# Patient Record
Sex: FEMALE | Race: WHITE
[De-identification: names, ages, dates, MRNs, and addresses within clinical notes are randomized per-mention and may not be internally consistent; named-entity substitution may affect disease eponyms.]

---

## 2017-04-28 ENCOUNTER — HOSPITAL ENCOUNTER (OUTPATIENT)
Dept: HOSPITAL 82 - ENDO | Age: 72
Discharge: HOME | End: 2017-04-28
Attending: INTERNAL MEDICINE
Payer: MEDICARE

## 2017-04-28 VITALS — HEIGHT: 59 IN | WEIGHT: 145 LBS | BODY MASS INDEX: 29.23 KG/M2

## 2017-04-28 VITALS — DIASTOLIC BLOOD PRESSURE: 60 MMHG | SYSTOLIC BLOOD PRESSURE: 135 MMHG

## 2017-04-28 DIAGNOSIS — K31.7: ICD-10-CM

## 2017-04-28 DIAGNOSIS — K44.9: ICD-10-CM

## 2017-04-28 DIAGNOSIS — E78.00: ICD-10-CM

## 2017-04-28 DIAGNOSIS — K21.9: ICD-10-CM

## 2017-04-28 DIAGNOSIS — I10: ICD-10-CM

## 2017-04-28 DIAGNOSIS — K29.70: ICD-10-CM

## 2017-04-28 DIAGNOSIS — R11.0: ICD-10-CM

## 2017-04-28 DIAGNOSIS — R14.0: Primary | ICD-10-CM

## 2017-04-28 PROCEDURE — 0DB48ZX EXCISION OF ESOPHAGOGASTRIC JUNCTION, VIA NATURAL OR ARTIFICIAL OPENING ENDOSCOPIC, DIAGNOSTIC: ICD-10-PCS | Performed by: INTERNAL MEDICINE

## 2017-04-28 PROCEDURE — 0DB68ZX EXCISION OF STOMACH, VIA NATURAL OR ARTIFICIAL OPENING ENDOSCOPIC, DIAGNOSTIC: ICD-10-PCS | Performed by: INTERNAL MEDICINE

## 2017-05-07 ENCOUNTER — HOSPITAL ENCOUNTER (EMERGENCY)
Dept: HOSPITAL 82 - ED | Age: 72
Discharge: HOME | End: 2017-05-07
Payer: MEDICARE

## 2017-05-07 VITALS — HEIGHT: 59 IN | BODY MASS INDEX: 28.89 KG/M2 | WEIGHT: 143.3 LBS

## 2017-05-07 VITALS — SYSTOLIC BLOOD PRESSURE: 136 MMHG | DIASTOLIC BLOOD PRESSURE: 90 MMHG

## 2017-05-07 DIAGNOSIS — N39.0: ICD-10-CM

## 2017-05-07 DIAGNOSIS — R10.31: Primary | ICD-10-CM

## 2017-05-07 DIAGNOSIS — R11.0: ICD-10-CM

## 2017-05-07 DIAGNOSIS — K57.30: ICD-10-CM

## 2017-05-07 DIAGNOSIS — R10.32: ICD-10-CM

## 2017-05-07 LAB
ALBUMIN SERPL-MCNC: 4.5 G/DL (ref 3.2–5)
ALP SERPL-CCNC: 57 U/L (ref 38–126)
ALT SERPL-CCNC: 42 U/L (ref 11–66)
AMYLASE SERPL-CCNC: 57 U/L (ref 30–110)
ANION GAP SERPL CALCULATED.3IONS-SCNC: 17 MMOL/L
AST SERPL-CCNC: 27 U/L (ref 9–36)
BASOPHILS NFR BLD AUTO: 1 % (ref 0–3)
BILIRUB UR QL STRIP.AUTO: NEGATIVE
BUN SERPL-MCNC: 14 MG/DL (ref 8–23)
BUN/CREAT SERPL: 17
CALCIUM SERPL-MCNC: 9.6 MG/DL (ref 8.4–10.2)
CHLORIDE SERPL-SCNC: 103 MMOL/L (ref 95–108)
CLARITY UR: CLEAR
CO2 SERPL-SCNC: 24 MMOL/L (ref 22–30)
COLOR UR AUTO: YELLOW
CREAT SERPL-MCNC: 0.8 MG/DL (ref 0.5–1)
EOSINOPHIL NFR BLD AUTO: 2 % (ref 0–8)
ERYTHROCYTE [DISTWIDTH] IN BLOOD BY AUTOMATED COUNT: 13.3 % (ref 11.5–15.5)
GLUCOSE SERPL-MCNC: 126 MG/DL (ref 82–115)
GLUCOSE UR STRIP.AUTO-MCNC: NEGATIVE MG/DL
HCT VFR BLD AUTO: 39.8 % (ref 37–47)
HGB BLD-MCNC: 13.3 G/DL (ref 12–16)
HGB UR QL STRIP.AUTO: NEGATIVE
IMM GRANULOCYTES NFR BLD: 0.4 % (ref 0–1)
KETONES UR STRIP.AUTO-MCNC: (no result) MG/DL
LEUKOCYTE ESTERASE UR QL STRIP.AUTO: (no result)
LIPASE SERPL-CCNC: 186 U/L (ref 23–300)
LYMPHOCYTES NFR BLD: 26 % (ref 15–41)
MCH RBC QN AUTO: 29.4 PG  CALC (ref 26–32)
MCHC RBC AUTO-ENTMCNC: 33.4 G/L CALC (ref 32–36)
MCV RBC AUTO: 87.9 FL  CALC (ref 80–100)
MONOCYTES NFR BLD AUTO: 7 % (ref 2–13)
MYOGLOBIN SERPL-MCNC: 30 NG/ML (ref 0–62)
NEUTROPHILS # BLD AUTO: 6.61 THOU/UL (ref 2–7.15)
NEUTROPHILS NFR BLD AUTO: 64 % (ref 42–76)
NITRITE UR QL STRIP.AUTO: NEGATIVE
PH UR STRIP.AUTO: 5 [PH] (ref 4.5–8)
PLATELET # BLD AUTO: 267 THOU/UL (ref 130–400)
POTASSIUM SERPL-SCNC: 3.9 MMOL/L (ref 3.5–5.1)
PROT SERPL-MCNC: 7.6 G/DL (ref 6.3–8.2)
PROT UR QL STRIP.AUTO: NEGATIVE MG/DL
RBC # BLD AUTO: 4.53 MILL/UL (ref 4.2–5.6)
SODIUM SERPL-SCNC: 139 MMOL/L (ref 137–146)
SP GR UR STRIP.AUTO: 1.01
SQUAMOUS URNS QL MICRO: (no result) EPI/HPF
UROBILINOGEN UR QL STRIP.AUTO: 0.2 E.U./DL
WBC #/AREA URNS HPF: (no result) WBC/HPF (ref 0–5)

## 2017-06-17 ENCOUNTER — HOSPITAL ENCOUNTER (EMERGENCY)
Dept: HOSPITAL 82 - ED | Age: 72
Discharge: HOME | End: 2017-06-17
Payer: MEDICARE

## 2017-06-17 VITALS — WEIGHT: 154.32 LBS | BODY MASS INDEX: 31.11 KG/M2 | HEIGHT: 59 IN

## 2017-06-17 VITALS — DIASTOLIC BLOOD PRESSURE: 82 MMHG | SYSTOLIC BLOOD PRESSURE: 122 MMHG

## 2017-06-17 DIAGNOSIS — F32.9: ICD-10-CM

## 2017-06-17 DIAGNOSIS — K21.9: ICD-10-CM

## 2017-06-17 DIAGNOSIS — E78.00: ICD-10-CM

## 2017-06-17 DIAGNOSIS — B02.9: Primary | ICD-10-CM

## 2017-06-17 DIAGNOSIS — I10: ICD-10-CM

## 2017-08-11 ENCOUNTER — HOSPITAL ENCOUNTER (OUTPATIENT)
Dept: HOSPITAL 82 - ENDO | Age: 72
Discharge: HOME | End: 2017-08-11
Attending: INTERNAL MEDICINE
Payer: MEDICARE

## 2017-08-11 VITALS — HEIGHT: 59 IN | WEIGHT: 140 LBS | BODY MASS INDEX: 28.22 KG/M2

## 2017-08-11 VITALS — DIASTOLIC BLOOD PRESSURE: 73 MMHG | SYSTOLIC BLOOD PRESSURE: 104 MMHG

## 2017-08-11 DIAGNOSIS — I10: ICD-10-CM

## 2017-08-11 DIAGNOSIS — K63.89: ICD-10-CM

## 2017-08-11 DIAGNOSIS — Z86.010: ICD-10-CM

## 2017-08-11 DIAGNOSIS — K64.8: ICD-10-CM

## 2017-08-11 DIAGNOSIS — Q43.9: ICD-10-CM

## 2017-08-11 DIAGNOSIS — E78.00: ICD-10-CM

## 2017-08-11 DIAGNOSIS — K64.4: ICD-10-CM

## 2017-08-11 DIAGNOSIS — K57.30: Primary | ICD-10-CM

## 2017-08-11 DIAGNOSIS — K29.70: ICD-10-CM

## 2017-08-11 DIAGNOSIS — R14.0: ICD-10-CM

## 2017-08-11 DIAGNOSIS — K62.1: ICD-10-CM

## 2017-08-11 DIAGNOSIS — K21.9: ICD-10-CM

## 2017-08-11 DIAGNOSIS — K44.9: ICD-10-CM

## 2017-08-11 DIAGNOSIS — D12.5: ICD-10-CM

## 2017-08-11 PROCEDURE — 0DBH8ZX EXCISION OF CECUM, VIA NATURAL OR ARTIFICIAL OPENING ENDOSCOPIC, DIAGNOSTIC: ICD-10-PCS | Performed by: INTERNAL MEDICINE

## 2017-08-11 PROCEDURE — 0DBP8ZX EXCISION OF RECTUM, VIA NATURAL OR ARTIFICIAL OPENING ENDOSCOPIC, DIAGNOSTIC: ICD-10-PCS | Performed by: INTERNAL MEDICINE

## 2017-08-11 PROCEDURE — 0DBN8ZX EXCISION OF SIGMOID COLON, VIA NATURAL OR ARTIFICIAL OPENING ENDOSCOPIC, DIAGNOSTIC: ICD-10-PCS | Performed by: INTERNAL MEDICINE

## 2019-01-18 ENCOUNTER — HOSPITAL ENCOUNTER (OUTPATIENT)
Dept: HOSPITAL 82 - ED | Age: 74
Setting detail: OBSERVATION
LOS: 1 days | Discharge: HOME | End: 2019-01-19
Attending: INTERNAL MEDICINE | Admitting: INTERNAL MEDICINE
Payer: MEDICARE

## 2019-01-18 VITALS — DIASTOLIC BLOOD PRESSURE: 77 MMHG | SYSTOLIC BLOOD PRESSURE: 136 MMHG

## 2019-01-18 VITALS — SYSTOLIC BLOOD PRESSURE: 137 MMHG | DIASTOLIC BLOOD PRESSURE: 88 MMHG

## 2019-01-18 VITALS — WEIGHT: 141.25 LBS | HEIGHT: 59 IN | BODY MASS INDEX: 28.48 KG/M2

## 2019-01-18 VITALS — DIASTOLIC BLOOD PRESSURE: 93 MMHG | SYSTOLIC BLOOD PRESSURE: 163 MMHG

## 2019-01-18 DIAGNOSIS — K21.9: ICD-10-CM

## 2019-01-18 DIAGNOSIS — G89.29: ICD-10-CM

## 2019-01-18 DIAGNOSIS — Z79.82: ICD-10-CM

## 2019-01-18 DIAGNOSIS — F41.8: ICD-10-CM

## 2019-01-18 DIAGNOSIS — E78.5: ICD-10-CM

## 2019-01-18 DIAGNOSIS — I10: ICD-10-CM

## 2019-01-18 DIAGNOSIS — M19.90: ICD-10-CM

## 2019-01-18 DIAGNOSIS — R07.9: Primary | ICD-10-CM

## 2019-01-18 DIAGNOSIS — M54.9: ICD-10-CM

## 2019-01-18 DIAGNOSIS — K58.9: ICD-10-CM

## 2019-01-18 DIAGNOSIS — K44.9: ICD-10-CM

## 2019-01-18 LAB
ALBUMIN SERPL-MCNC: 4.4 G/DL (ref 3.2–5)
ALP SERPL-CCNC: 50 U/L (ref 38–126)
ANION GAP SERPL CALCULATED.3IONS-SCNC: 15 MMOL/L
AST SERPL-CCNC: 25 U/L (ref 9–36)
BASOPHILS NFR BLD AUTO: 1 % (ref 0–3)
BILIRUB UR QL STRIP.AUTO: NEGATIVE
BUN SERPL-MCNC: 13 MG/DL (ref 8–23)
BUN/CREAT SERPL: 23
CHLORIDE SERPL-SCNC: 104 MMOL/L (ref 95–108)
CO2 SERPL-SCNC: 22 MMOL/L (ref 22–30)
COLOR UR AUTO: YELLOW
CREAT SERPL-MCNC: 0.5 MG/DL (ref 0.5–1)
EOSINOPHIL NFR BLD AUTO: 3 % (ref 0–8)
ERYTHROCYTE [DISTWIDTH] IN BLOOD BY AUTOMATED COUNT: 12.6 % (ref 11.5–15.5)
GLUCOSE UR STRIP.AUTO-MCNC: NEGATIVE MG/DL
HCT VFR BLD AUTO: 42.1 % (ref 37–47)
HGB BLD-MCNC: 14.3 G/DL (ref 12–16)
HGB UR QL STRIP.AUTO: NEGATIVE
IMM GRANULOCYTES NFR BLD: 0.2 % (ref 0–5)
KETONES UR STRIP.AUTO-MCNC: NEGATIVE MG/DL
LEUKOCYTE ESTERASE UR QL STRIP.AUTO: NEGATIVE
LYMPHOCYTES NFR BLD: 34 % (ref 15–41)
MCH RBC QN AUTO: 29.5 PG  CALC (ref 26–32)
MCHC RBC AUTO-ENTMCNC: 34 G/L CALC (ref 32–36)
MCV RBC AUTO: 86.8 FL  CALC (ref 80–100)
MONOCYTES NFR BLD AUTO: 7 % (ref 2–13)
MYOGLOBIN SERPL-MCNC: 31 NG/ML (ref 0–62)
NEUTROPHILS # BLD AUTO: 3.15 THOU/UL (ref 2–7.15)
NEUTROPHILS NFR BLD AUTO: 55 % (ref 42–76)
NITRITE UR QL STRIP.AUTO: NEGATIVE
PH UR STRIP.AUTO: 7 [PH] (ref 4.5–8)
PLATELET # BLD AUTO: 253 THOU/UL (ref 130–400)
POTASSIUM SERPL-SCNC: 4.1 MMOL/L (ref 3.5–5.1)
PROT SERPL-MCNC: 7 G/DL (ref 6.3–8.2)
PROT UR QL STRIP.AUTO: NEGATIVE MG/DL
RBC # BLD AUTO: 4.85 MILL/UL (ref 4.2–5.6)
SODIUM SERPL-SCNC: 137 MMOL/L (ref 137–146)
SP GR UR STRIP.AUTO: <=1.005
UROBILINOGEN UR QL STRIP.AUTO: 0.2 E.U./DL

## 2019-01-18 PROCEDURE — S0164 INJECTION PANTROPRAZOLE: HCPCS

## 2019-01-18 NOTE — NUR
PT ASSESSED, PT DENIES PAIN/N/V, REPORTS "SMALL GASSY FEELING TWINGES UP
IN CHEST ONCE IN AWHILE." LUNG SOUNDS ARE CLEAR, ABD SOFT TENDER TO LOWER QUAD
BILAT/PT REPORTS THIS IS HER NORMAL, REPORTS BMX1 TODAY NORMAL, DENIES
DIFFICULTY URINATING, LOCX4, NEURO'S INTACT, SKIN INTACT, NO EDEMA NOTED.
MURTAZA'S AND SCD'S ARE ON. POC AND LABS DISCUSSED W/PT. PT DENIES ANY OTHER
QUESTIONS AT THIS TIME. CALL LIGHT AT BEDSIDE AND PT OFFERED SNACK/PROVIDED.
PT ENCOURAGED TO CALL IF ANY OTHER SYMPTOMS OR NEEDS ARISE.

## 2019-01-18 NOTE — NUR
PT ARRIVED ON FLOOR @ 11:00;  VIA W/C ACCOMPANIED BY ROBERTA MARCELINO AND PT'S
SIGNIFICANT OTHER; PT AMBULATED WITH STEADY GAIT ON DIGITAL SCALE; WT
OBTAINED; RESP EVEN AND UNLABORED;  TELE IN PLACE; PT REPORT NO PAIN; PT
ORIENT TO ROOM AND CALL TILLEY SYSTEM; STATES SHES HUNGRY; WILL CONTINUE TO
MONITOR.

## 2019-01-18 NOTE — NUR
PT IS IN BED W/ AT BEDSIDE. NO S/O DISTRESS NOTED, PT DENIES ANY
PAIN/N/V AT THIS TIME. BEDSIDE REPORT RECEIVED,POC DISCUSSED AND PT ENCOURAGED
TO CALL AS NEEDS ARISE OR ANY SYMPTOMS ARISE. CALL LIGHT AT BEDSIDE.

## 2019-01-18 NOTE — NUR
DR DORSEY AND GUI, AT BEDSIDE TO DISCUSS POC;  PT LAYING IN BED WITH HOB
ELEVATED, RESP EVEN AND UNLABORED; IV SITE APPEARS HEALTHY; CALL TILLEY IN
REACH.

## 2019-01-18 NOTE — NUR
PT SITTING UP IN BED EATING LUNCH AND LOOKING AT HER PHONE; STATES SHE'S VERY
HUNGRY; EXPLAINED MURTAZA HOSE AND SCD; PT VOICE UNDERSTANDING; VOICE NO PAIN; SR
69 ON MONITOR; CALL TILLEY IN REACH; SAFETY PRECAUTION REINFORCE;

## 2019-01-19 VITALS — SYSTOLIC BLOOD PRESSURE: 133 MMHG | DIASTOLIC BLOOD PRESSURE: 83 MMHG

## 2019-01-19 VITALS — DIASTOLIC BLOOD PRESSURE: 81 MMHG | SYSTOLIC BLOOD PRESSURE: 158 MMHG

## 2019-01-19 VITALS — DIASTOLIC BLOOD PRESSURE: 85 MMHG | SYSTOLIC BLOOD PRESSURE: 158 MMHG

## 2019-01-19 VITALS — DIASTOLIC BLOOD PRESSURE: 50 MMHG | SYSTOLIC BLOOD PRESSURE: 148 MMHG

## 2019-01-19 LAB
CHOLEST SERPL-MCNC: 243 MG/DL (ref 0–199)
CHOLEST/HDLC SERPL: 3.8 {RATIO}
HDLC SERPL-MCNC: 64 MG/DL (ref 40–?)
LDLC SERPL CALC-MCNC: 152 MG/DL
TRIGL SERPL-MCNC: 137 MG/DL (ref 30–149)
VLDLC SERPL CALC-MCNC: 27 MG/DL

## 2019-01-19 NOTE — NUR
PT REPORT RECIEVED FROM ROBERTA REDMOND. PT RESTING IN BED. NO S/S OF DISTRESS. CALL
LIGHT IN REACH. WILL CONTINUE TO MONITOR.

## 2019-01-19 NOTE — NUR
Discharge instructions given. Patient verbalizes understanding of same.
Discharged in \stable condition via Wheelchair to Home with
family. All belongings sent with pt.

## 2019-01-19 NOTE — NUR
PT RESTNG IN BED. NO C/O PAIN OR NEEDS. RESP EVEN AND UNLABORED. TELE IN
PLACE. CALL LIGHT IN REACH. WILL CONTINUE TO MONITOR.

## 2019-01-19 NOTE — NUR
PT APPEARS TO BE SLEEPING AT THIS TIME. NO S/O DISTRESS, CALL LIGHT AT SIDE.
DENIES ANY NEEDS, DRESSING TO WOUND VAC APPEARS INTACT WITH SUCTION ON .

## 2019-01-19 NOTE — NUR
D/C INSTRUCTIONS DISCUSSED W/PT. PT STATES UNDERSTANDING. IV REMOVED. CATHETER
INTACT. PT DRESSED AND READY TO GO. DANIEL HUNTLEY TO WHEEL PATIENT DOWNSTAIRS.

## 2019-01-19 NOTE — NUR
LAB IN W/PT AT THIS TIME, PT DENIES ANY PAIN/N/V AT THIS TIME. NO S/O DISTRSES
NOTED,  PT REPORTS HAVING BEEN SLEEPING SOUNDLY W/O DISTRESS, PAIN OR SOB.

## 2019-01-19 NOTE — NUR
PT A/OX3. SPEECH IS CLEAR. NO C/O CHEST PAIN AT THIS TIME. RESP EVEN AND
UNLABORED. LUNG SOUNDS CLEAR. TELE IN PLACE. BOWEL SOUNDS ACTIVE X4. STRONG
RADIAL AND PEDAL PULSES. #20 RAC SL. FLUSHED AND PATENT. SCD AND MURTAZA HOSES IN
PLACE. SKIN INTACT. PT DENIES ANY NEEDS AT THIS TIME. POC DISCUSSED. SAFETY
PRECAUTIONS IN PLACE. CALL LIGHT IN REACH. WILL CONTINUE TO MONITOR.

## 2019-03-20 ENCOUNTER — HOSPITAL ENCOUNTER (EMERGENCY)
Dept: HOSPITAL 82 - ED | Age: 74
Discharge: HOME | End: 2019-03-20
Payer: MEDICARE

## 2019-03-20 VITALS — HEIGHT: 59 IN | BODY MASS INDEX: 28.89 KG/M2 | WEIGHT: 143.3 LBS

## 2019-03-20 VITALS — DIASTOLIC BLOOD PRESSURE: 98 MMHG | SYSTOLIC BLOOD PRESSURE: 145 MMHG

## 2019-03-20 DIAGNOSIS — R51: ICD-10-CM

## 2019-03-20 DIAGNOSIS — I10: Primary | ICD-10-CM

## 2019-03-20 LAB
ANION GAP SERPL CALCULATED.3IONS-SCNC: 16 MMOL/L
BASOPHILS NFR BLD AUTO: 1 % (ref 0–3)
BUN SERPL-MCNC: 14 MG/DL (ref 8–23)
BUN/CREAT SERPL: 23
CHLORIDE SERPL-SCNC: 102 MMOL/L (ref 95–108)
CO2 SERPL-SCNC: 22 MMOL/L (ref 22–30)
CREAT SERPL-MCNC: 0.6 MG/DL (ref 0.5–1)
EOSINOPHIL NFR BLD AUTO: 2 % (ref 0–8)
ERYTHROCYTE [DISTWIDTH] IN BLOOD BY AUTOMATED COUNT: 14.7 % (ref 11.5–15.5)
HCT VFR BLD AUTO: 39.6 % (ref 37–47)
HGB BLD-MCNC: 13.2 G/DL (ref 12–16)
IMM GRANULOCYTES NFR BLD: 0.3 % (ref 0–5)
LYMPHOCYTES NFR BLD: 31 % (ref 15–41)
MCH RBC QN AUTO: 28.7 PG  CALC (ref 26–32)
MCHC RBC AUTO-ENTMCNC: 33.3 G/L CALC (ref 32–36)
MCV RBC AUTO: 86.1 FL  CALC (ref 80–100)
MONOCYTES NFR BLD AUTO: 8 % (ref 2–13)
NEUTROPHILS # BLD AUTO: 4.47 THOU/UL (ref 2–7.15)
NEUTROPHILS NFR BLD AUTO: 58 % (ref 42–76)
PLATELET # BLD AUTO: 253 THOU/UL (ref 130–400)
POTASSIUM SERPL-SCNC: 4.1 MMOL/L (ref 3.5–5.1)
RBC # BLD AUTO: 4.6 MILL/UL (ref 4.2–5.6)
SODIUM SERPL-SCNC: 136 MMOL/L (ref 137–146)

## 2022-11-30 ENCOUNTER — HOSPITAL ENCOUNTER (EMERGENCY)
Dept: HOSPITAL 82 - ED | Age: 77
Discharge: HOME | End: 2022-11-30
Payer: MEDICARE

## 2022-11-30 VITALS — SYSTOLIC BLOOD PRESSURE: 139 MMHG | DIASTOLIC BLOOD PRESSURE: 95 MMHG

## 2022-11-30 VITALS — WEIGHT: 140.21 LBS | HEIGHT: 59 IN | BODY MASS INDEX: 28.27 KG/M2

## 2022-11-30 VITALS — SYSTOLIC BLOOD PRESSURE: 149 MMHG | DIASTOLIC BLOOD PRESSURE: 102 MMHG

## 2022-11-30 VITALS — DIASTOLIC BLOOD PRESSURE: 90 MMHG | SYSTOLIC BLOOD PRESSURE: 161 MMHG

## 2022-11-30 VITALS — SYSTOLIC BLOOD PRESSURE: 147 MMHG | DIASTOLIC BLOOD PRESSURE: 101 MMHG

## 2022-11-30 VITALS — SYSTOLIC BLOOD PRESSURE: 141 MMHG | DIASTOLIC BLOOD PRESSURE: 105 MMHG

## 2022-11-30 VITALS — DIASTOLIC BLOOD PRESSURE: 99 MMHG | SYSTOLIC BLOOD PRESSURE: 159 MMHG

## 2022-11-30 VITALS — DIASTOLIC BLOOD PRESSURE: 103 MMHG | SYSTOLIC BLOOD PRESSURE: 173 MMHG

## 2022-11-30 VITALS — DIASTOLIC BLOOD PRESSURE: 90 MMHG | SYSTOLIC BLOOD PRESSURE: 134 MMHG

## 2022-11-30 DIAGNOSIS — E11.9: ICD-10-CM

## 2022-11-30 DIAGNOSIS — E78.5: ICD-10-CM

## 2022-11-30 DIAGNOSIS — R42: Primary | ICD-10-CM

## 2022-11-30 DIAGNOSIS — I10: ICD-10-CM

## 2022-11-30 DIAGNOSIS — K21.9: ICD-10-CM

## 2022-11-30 DIAGNOSIS — Z20.822: ICD-10-CM

## 2022-11-30 DIAGNOSIS — F32.A: ICD-10-CM

## 2022-11-30 LAB
ALBUMIN SERPL-MCNC: 4.5 G/DL (ref 3.2–5)
ALP SERPL-CCNC: 31 U/L (ref 38–126)
ANION GAP SERPL CALCULATED.3IONS-SCNC: 15 MMOL/L
AST SERPL-CCNC: 36 U/L (ref 9–36)
BASOPHILS NFR BLD AUTO: 1 % (ref 0–3)
BILIRUB UR QL STRIP.AUTO: NEGATIVE
BUN SERPL-MCNC: 14 MG/DL (ref 8–23)
BUN/CREAT SERPL: 23
CHLORIDE SERPL-SCNC: 101 MMOL/L (ref 95–108)
CO2 SERPL-SCNC: 25 MMOL/L (ref 22–30)
COLOR UR AUTO: YELLOW
CREAT SERPL-MCNC: 0.6 MG/DL (ref 0.5–1)
EOSINOPHIL NFR BLD AUTO: 2 % (ref 0–8)
ERYTHROCYTE [DISTWIDTH] IN BLOOD BY AUTOMATED COUNT: 12.8 % (ref 11.5–15.5)
GLUCOSE UR STRIP.AUTO-MCNC: NEGATIVE MG/DL
HCT VFR BLD AUTO: 39.9 % (ref 37–47)
HGB BLD-MCNC: 13.4 G/DL (ref 12–16)
HGB UR QL STRIP.AUTO: NEGATIVE
IMM GRANULOCYTES NFR BLD: 0.4 % (ref 0–5)
KETONES UR STRIP.AUTO-MCNC: NEGATIVE MG/DL
LEUKOCYTE ESTERASE UR QL STRIP.AUTO: (no result)
LYMPHOCYTES NFR BLD: 27 % (ref 15–41)
MCH RBC QN AUTO: 30.5 PG  CALC (ref 26–32)
MCHC RBC AUTO-ENTMCNC: 33.6 G/DL CAL (ref 32–36)
MCV RBC AUTO: 90.9 FL  CALC (ref 80–100)
MONOCYTES NFR BLD AUTO: 9 % (ref 2–13)
NEUTROPHILS # BLD AUTO: 4.82 THOU/UL (ref 2–7.15)
NEUTROPHILS NFR BLD AUTO: 61 % (ref 42–76)
NITRITE UR QL STRIP.AUTO: NEGATIVE
PH UR STRIP.AUTO: 6 [PH] (ref 4.5–8)
PLATELET # BLD AUTO: 259 THOU/UL (ref 130–400)
POTASSIUM SERPL-SCNC: 4.2 MMOL/L (ref 3.5–5.1)
PROT SERPL-MCNC: 7.3 G/DL (ref 6.3–8.2)
PROT UR QL STRIP.AUTO: NEGATIVE MG/DL
RBC # BLD AUTO: 4.39 MILL/UL (ref 4.2–5.6)
SODIUM SERPL-SCNC: 136 MMOL/L (ref 137–146)
SP GR UR STRIP.AUTO: 1.01
UROBILINOGEN UR QL STRIP.AUTO: 0.2 E.U./DL

## 2023-01-25 ENCOUNTER — APPOINTMENT (RX ONLY)
Dept: URBAN - NONMETROPOLITAN AREA CLINIC 10 | Facility: CLINIC | Age: 78
Setting detail: DERMATOLOGY
End: 2023-01-25

## 2023-01-25 DIAGNOSIS — L71.0 PERIORAL DERMATITIS: ICD-10-CM

## 2023-01-25 DIAGNOSIS — L82.1 OTHER SEBORRHEIC KERATOSIS: ICD-10-CM

## 2023-01-25 PROBLEM — D23.5 OTHER BENIGN NEOPLASM OF SKIN OF TRUNK: Status: ACTIVE | Noted: 2023-01-25

## 2023-01-25 PROCEDURE — ? COUNSELING

## 2023-01-25 PROCEDURE — ? PRESCRIPTION

## 2023-01-25 PROCEDURE — 99203 OFFICE O/P NEW LOW 30 MIN: CPT

## 2023-01-25 RX ORDER — METRONIDAZOLE 7.5 MG/G
1 CREAM TOPICAL BID
Qty: 45 | Refills: 3 | Status: ERX | COMMUNITY
Start: 2023-01-25

## 2023-01-25 RX ADMIN — METRONIDAZOLE 1: 7.5 CREAM TOPICAL at 00:00

## 2023-01-25 ASSESSMENT — LOCATION DETAILED DESCRIPTION DERM
LOCATION DETAILED: INFERIOR MID FOREHEAD
LOCATION DETAILED: LEFT CLAVICULAR SKIN
LOCATION DETAILED: RIGHT SUPERIOR MEDIAL BUCCAL CHEEK
LOCATION DETAILED: LEFT CENTRAL MALAR CHEEK
LOCATION DETAILED: RIGHT MEDIAL MALAR CHEEK

## 2023-01-25 ASSESSMENT — LOCATION ZONE DERM
LOCATION ZONE: TRUNK
LOCATION ZONE: FACE

## 2023-01-25 ASSESSMENT — LOCATION SIMPLE DESCRIPTION DERM
LOCATION SIMPLE: INFERIOR FOREHEAD
LOCATION SIMPLE: RIGHT CHEEK
LOCATION SIMPLE: LEFT CHEEK
LOCATION SIMPLE: LEFT CLAVICULAR SKIN

## 2025-01-08 ENCOUNTER — HOSPITAL ENCOUNTER (OUTPATIENT)
Dept: HOSPITAL 82 - ED | Age: 80
Setting detail: OBSERVATION
LOS: 2 days | Discharge: LEFT BEFORE DISCHARGE | End: 2025-01-10
Attending: INTERNAL MEDICINE | Admitting: INTERNAL MEDICINE
Payer: MEDICARE

## 2025-01-08 VITALS — SYSTOLIC BLOOD PRESSURE: 142 MMHG | DIASTOLIC BLOOD PRESSURE: 79 MMHG

## 2025-01-08 VITALS — DIASTOLIC BLOOD PRESSURE: 85 MMHG | SYSTOLIC BLOOD PRESSURE: 125 MMHG

## 2025-01-08 VITALS — DIASTOLIC BLOOD PRESSURE: 113 MMHG | SYSTOLIC BLOOD PRESSURE: 157 MMHG

## 2025-01-08 VITALS — SYSTOLIC BLOOD PRESSURE: 141 MMHG | DIASTOLIC BLOOD PRESSURE: 85 MMHG

## 2025-01-08 VITALS — SYSTOLIC BLOOD PRESSURE: 135 MMHG | DIASTOLIC BLOOD PRESSURE: 88 MMHG

## 2025-01-08 VITALS — DIASTOLIC BLOOD PRESSURE: 108 MMHG | SYSTOLIC BLOOD PRESSURE: 177 MMHG

## 2025-01-08 VITALS — SYSTOLIC BLOOD PRESSURE: 162 MMHG | DIASTOLIC BLOOD PRESSURE: 94 MMHG

## 2025-01-08 VITALS — DIASTOLIC BLOOD PRESSURE: 107 MMHG | SYSTOLIC BLOOD PRESSURE: 155 MMHG

## 2025-01-08 VITALS — DIASTOLIC BLOOD PRESSURE: 98 MMHG | SYSTOLIC BLOOD PRESSURE: 177 MMHG

## 2025-01-08 VITALS — SYSTOLIC BLOOD PRESSURE: 122 MMHG | DIASTOLIC BLOOD PRESSURE: 101 MMHG

## 2025-01-08 VITALS — SYSTOLIC BLOOD PRESSURE: 132 MMHG | DIASTOLIC BLOOD PRESSURE: 76 MMHG

## 2025-01-08 VITALS — DIASTOLIC BLOOD PRESSURE: 90 MMHG | SYSTOLIC BLOOD PRESSURE: 151 MMHG

## 2025-01-08 VITALS — SYSTOLIC BLOOD PRESSURE: 143 MMHG | DIASTOLIC BLOOD PRESSURE: 90 MMHG

## 2025-01-08 VITALS — SYSTOLIC BLOOD PRESSURE: 188 MMHG | DIASTOLIC BLOOD PRESSURE: 104 MMHG

## 2025-01-08 VITALS — SYSTOLIC BLOOD PRESSURE: 132 MMHG | DIASTOLIC BLOOD PRESSURE: 82 MMHG

## 2025-01-08 VITALS — DIASTOLIC BLOOD PRESSURE: 107 MMHG | SYSTOLIC BLOOD PRESSURE: 174 MMHG

## 2025-01-08 VITALS — DIASTOLIC BLOOD PRESSURE: 93 MMHG | SYSTOLIC BLOOD PRESSURE: 157 MMHG

## 2025-01-08 VITALS — DIASTOLIC BLOOD PRESSURE: 118 MMHG | SYSTOLIC BLOOD PRESSURE: 171 MMHG

## 2025-01-08 VITALS — SYSTOLIC BLOOD PRESSURE: 148 MMHG | DIASTOLIC BLOOD PRESSURE: 93 MMHG

## 2025-01-08 VITALS — DIASTOLIC BLOOD PRESSURE: 89 MMHG | SYSTOLIC BLOOD PRESSURE: 129 MMHG

## 2025-01-08 VITALS — DIASTOLIC BLOOD PRESSURE: 94 MMHG | SYSTOLIC BLOOD PRESSURE: 145 MMHG

## 2025-01-08 VITALS — DIASTOLIC BLOOD PRESSURE: 113 MMHG | SYSTOLIC BLOOD PRESSURE: 179 MMHG

## 2025-01-08 VITALS — DIASTOLIC BLOOD PRESSURE: 93 MMHG | SYSTOLIC BLOOD PRESSURE: 144 MMHG

## 2025-01-08 DIAGNOSIS — S32.592A: Primary | ICD-10-CM

## 2025-01-08 DIAGNOSIS — F41.8: ICD-10-CM

## 2025-01-08 DIAGNOSIS — E78.5: ICD-10-CM

## 2025-01-08 DIAGNOSIS — R39.15: ICD-10-CM
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
## 2025-01-08 DIAGNOSIS — E11.9: ICD-10-CM

## 2025-01-08 DIAGNOSIS — I10: ICD-10-CM

## 2025-01-08 DIAGNOSIS — R30.0: ICD-10-CM

## 2025-01-08 DIAGNOSIS — K21.9: ICD-10-CM

## 2025-01-08 LAB
ALBUMIN SERPL-MCNC: 4.6 G/DL (ref 3.2–5)
ALP SERPL-CCNC: 47 U/L (ref 38–126)
ANION GAP SERPL CALCULATED.3IONS-SCNC: 13 MMOL/L
APTT PPP: 22.6 SECONDS (ref 20–32.5)
AST SERPL-CCNC: 37 U/L (ref 9–36)
BASOPHILS NFR BLD AUTO: 0.6 % (ref 0–3)
BUN SERPL-MCNC: 9 MG/DL (ref 8–23)
BUN/CREAT SERPL: 15
CHLORIDE SERPL-SCNC: 103 MMOL/L (ref 95–108)
CO2 SERPL-SCNC: 27 MMOL/L (ref 22–30)
CREAT SERPL-MCNC: 0.6 MG/DL (ref 0.5–1)
EOSINOPHIL NFR BLD AUTO: 1.7 % (ref 0–8)
ERYTHROCYTE [DISTWIDTH] IN BLOOD BY AUTOMATED COUNT: 13.6 % (ref 11.5–15.5)
HCT VFR BLD AUTO: 40.9 % (ref 37–47)
HGB BLD-MCNC: 13.3 G/DL (ref 12–16)
IMM GRANULOCYTES NFR BLD: 1.3 % (ref 0–5)
INR PPP: 1 RATIO (ref 0.7–1.3)
LYMPHOCYTES NFR BLD: 19.5 % (ref 15–41)
MCH RBC QN AUTO: 29.6 PG  CALC (ref 26–32)
MCHC RBC AUTO-ENTMCNC: 32.5 G/DL CAL (ref 32–36)
MCV RBC AUTO: 91.1 FL  CALC (ref 80–100)
MONOCYTES NFR BLD AUTO: 8.1 % (ref 2–13)
NEUTROPHILS # BLD AUTO: 7.08 THOU/UL (ref 2–7.15)
NEUTROPHILS NFR BLD AUTO: 68.8 % (ref 42–76)
PLATELET # BLD AUTO: 207 THOU/UL (ref 130–400)
POTASSIUM SERPL-SCNC: 3.5 MMOL/L (ref 3.5–5.1)
PROT SERPL-MCNC: 7.5 G/DL (ref 6.3–8.2)
PROTHROMBIN TIME: 10.6 SECONDS (ref 9–12.5)
RBC # BLD AUTO: 4.49 MILL/UL (ref 4.2–5.6)
SODIUM SERPL-SCNC: 139 MMOL/L (ref 137–146)

## 2025-01-09 VITALS — DIASTOLIC BLOOD PRESSURE: 94 MMHG | SYSTOLIC BLOOD PRESSURE: 164 MMHG

## 2025-01-09 VITALS — DIASTOLIC BLOOD PRESSURE: 74 MMHG | SYSTOLIC BLOOD PRESSURE: 104 MMHG

## 2025-01-09 VITALS — DIASTOLIC BLOOD PRESSURE: 94 MMHG | SYSTOLIC BLOOD PRESSURE: 145 MMHG

## 2025-01-09 VITALS — SYSTOLIC BLOOD PRESSURE: 118 MMHG | DIASTOLIC BLOOD PRESSURE: 71 MMHG

## 2025-01-09 VITALS — SYSTOLIC BLOOD PRESSURE: 152 MMHG | DIASTOLIC BLOOD PRESSURE: 88 MMHG

## 2025-01-10 VITALS — SYSTOLIC BLOOD PRESSURE: 144 MMHG | DIASTOLIC BLOOD PRESSURE: 90 MMHG

## 2025-01-10 VITALS — DIASTOLIC BLOOD PRESSURE: 85 MMHG | SYSTOLIC BLOOD PRESSURE: 126 MMHG

## 2025-01-10 VITALS — SYSTOLIC BLOOD PRESSURE: 126 MMHG | DIASTOLIC BLOOD PRESSURE: 85 MMHG
